# Patient Record
Sex: MALE | Race: WHITE
[De-identification: names, ages, dates, MRNs, and addresses within clinical notes are randomized per-mention and may not be internally consistent; named-entity substitution may affect disease eponyms.]

---

## 2019-11-13 ENCOUNTER — HOSPITAL ENCOUNTER (EMERGENCY)
Dept: HOSPITAL 92 - ERS | Age: 29
Discharge: HOME | End: 2019-11-13
Payer: COMMERCIAL

## 2019-11-13 DIAGNOSIS — Z87.442: ICD-10-CM

## 2019-11-13 DIAGNOSIS — F17.210: ICD-10-CM

## 2019-11-13 DIAGNOSIS — N13.2: Primary | ICD-10-CM

## 2019-11-13 DIAGNOSIS — Z79.899: ICD-10-CM

## 2019-11-13 LAB
ALBUMIN SERPL BCG-MCNC: 4.5 G/DL (ref 3.5–5)
ALP SERPL-CCNC: 64 U/L (ref 40–110)
ALT SERPL W P-5'-P-CCNC: 23 U/L (ref 8–55)
ANION GAP SERPL CALC-SCNC: 13 MMOL/L (ref 10–20)
AST SERPL-CCNC: 22 U/L (ref 5–34)
BASOPHILS # BLD AUTO: 0.1 THOU/UL (ref 0–0.2)
BASOPHILS NFR BLD AUTO: 0.8 % (ref 0–1)
BILIRUB SERPL-MCNC: 0.3 MG/DL (ref 0.2–1.2)
BUN SERPL-MCNC: 13 MG/DL (ref 8.9–20.6)
CALCIUM SERPL-MCNC: 9.3 MG/DL (ref 7.8–10.44)
CHLORIDE SERPL-SCNC: 107 MMOL/L (ref 98–107)
CO2 SERPL-SCNC: 23 MMOL/L (ref 22–29)
CREAT CL PREDICTED SERPL C-G-VRATE: 0 ML/MIN (ref 70–130)
EOSINOPHIL # BLD AUTO: 0.4 THOU/UL (ref 0–0.7)
EOSINOPHIL NFR BLD AUTO: 5.4 % (ref 0–10)
GLOBULIN SER CALC-MCNC: 2.9 G/DL (ref 2.4–3.5)
GLUCOSE SERPL-MCNC: 110 MG/DL (ref 70–105)
HGB BLD-MCNC: 16.4 G/DL (ref 14–18)
LYMPHOCYTES # BLD: 3.2 THOU/UL (ref 1.2–3.4)
LYMPHOCYTES NFR BLD AUTO: 39.1 % (ref 21–51)
MCH RBC QN AUTO: 30.1 PG (ref 27–31)
MCV RBC AUTO: 90.8 FL (ref 78–98)
MONOCYTES # BLD AUTO: 0.7 THOU/UL (ref 0.11–0.59)
MONOCYTES NFR BLD AUTO: 9.1 % (ref 0–10)
NEUTROPHILS # BLD AUTO: 3.7 THOU/UL (ref 1.4–6.5)
NEUTROPHILS NFR BLD AUTO: 45.5 % (ref 42–75)
PLATELET # BLD AUTO: 260 THOU/UL (ref 130–400)
POTASSIUM SERPL-SCNC: 4.1 MMOL/L (ref 3.5–5.1)
RBC # BLD AUTO: 5.45 MILL/UL (ref 4.7–6.1)
SODIUM SERPL-SCNC: 139 MMOL/L (ref 136–145)
WBC # BLD AUTO: 8.1 THOU/UL (ref 4.8–10.8)

## 2019-11-13 PROCEDURE — 85025 COMPLETE CBC W/AUTO DIFF WBC: CPT

## 2019-11-13 PROCEDURE — 74176 CT ABD & PELVIS W/O CONTRAST: CPT

## 2019-11-13 PROCEDURE — 36415 COLL VENOUS BLD VENIPUNCTURE: CPT

## 2019-11-13 PROCEDURE — 80053 COMPREHEN METABOLIC PANEL: CPT

## 2019-11-13 NOTE — CT
PRELIMINARY REPORT/[facility]/EMERGENCY AFTER

HOURS PROCEDURE: 

 

PROCEDURE INFORMATION:

Exam: CT Abdomen And Pelvis Without Contrast

Exam date and time: 11/13/2019 5:50 AM

 

Clinical history: 29 years old, male; Abdominal pain; Right; Patient HX: M29 presents to ED with C/O 
R flank pain onset this morning. PT states that he woke up this morning and went to the restroom when
 after urinating the R flank pain onset. PT reports noticing his urine to be a different color than u
sual. PT reports past HX of kidney stones and reports that this feels similar. PT states that his las
t kidney stone was approx. 10 years ago and he was able to pass it by himself. PT denies fever, chill
s, and n/v/d

 

TECHNIQUE:

Imaging protocol: Computed tomography of the abdomen and pelvis without contrast.

 

COMPARISON:

No relevant prior studies available.

 

FINDINGS:

Lungs: The lung bases are clear.

Liver: Unremarkable.

Gallbladder and bile ducts: No definite gallbladder abnormality by CT. No biliary tree dilation.

Pancreas: Unremarkable.

Spleen: Unremarkable.

Adrenals: Unremarkable.

Kidneys and ureters:

Mild right hydronephrosis and hydroureter.

There is a 5 mm distal right ureteral calculus, about 5 cm of the UVJ. The calculus lies at about the
 S2 level.

No definite intrarenal calculus.

The left kidney appears essentially unremarkable.

Stomach and bowel: There are no CT findings to strongly suggest diverticulitis.

Appendix: The appendix is visualized and appears normal.

Intraperitoneal space: No free air, ascites, or bowel distention.

Vasculature: No evidence for abdominal aortic aneurysm.

Lymph nodes: No retroperitoneal adenopathy.

Bladder: Possibly some mild diffuse urinary bladder wall thickening. CT evaluation is limited, as the
 bladder is almost empty. While nonspecific, this could indicate evidence for cystitis. Please correl
ate clinically.

Reproductive: Essentially unremarkable for age.

Bones/joints: No significant acute finding.

Soft tissues: No significant acute finding.

 

IMPRESSION:

1. 5 mm distal right ureteral calculus, details above.

2. Mild right hydronephrosis and hydroureter.

3. Possible mild urinary bladder wall thickening, see above.

4. Normal appendix.

5. Other findings discussed above.

 

Thank you for allowing us to participate in the care of your patient.

Dictated and Authenticated by: Harpreet Alfonso MD

11/13/2019 6:10 AM Central Time ([place] & [place])

 

 

FINAL REPORT 

 

CT ABDOMEN AND PELVIS WITHOUT CONTRAST:

 

FINDINGS/IMPRESSION: 

I agree with the preliminary report given by Lyndsey.

 

POS: [facility]